# Patient Record
Sex: FEMALE | Race: WHITE | NOT HISPANIC OR LATINO | URBAN - METROPOLITAN AREA
[De-identification: names, ages, dates, MRNs, and addresses within clinical notes are randomized per-mention and may not be internally consistent; named-entity substitution may affect disease eponyms.]

---

## 2018-07-12 ENCOUNTER — TELEPHONE (OUTPATIENT)
Dept: GYNECOLOGY | Facility: CLINIC | Age: 23
End: 2018-07-12

## 2018-07-12 RX ORDER — DROSPIRENONE AND ETHINYL ESTRADIOL 0.02-3(28)
KIT ORAL EVERY 24 HOURS
COMMUNITY
Start: 2017-10-12 | End: 2018-10-25

## 2018-07-12 RX ORDER — ESTRADIOL 1 MG/1
TABLET ORAL
COMMUNITY
Start: 2017-10-12 | End: 2018-10-25

## 2018-07-13 ENCOUNTER — TELEPHONE (OUTPATIENT)
Dept: GYNECOLOGY | Facility: CLINIC | Age: 23
End: 2018-07-13

## 2018-07-13 DIAGNOSIS — Z30.41 SURVEILLANCE OF CONTRACEPTIVE PILL: Primary | ICD-10-CM

## 2018-07-13 RX ORDER — DROSPIRENONE AND ETHINYL ESTRADIOL 0.02-3(28)
1 KIT ORAL DAILY
Qty: 28 TABLET | Refills: 0 | Status: SHIPPED | OUTPATIENT
Start: 2018-07-13 | End: 2018-10-25

## 2018-10-25 ENCOUNTER — ANNUAL EXAM (OUTPATIENT)
Dept: GYNECOLOGY | Facility: CLINIC | Age: 23
End: 2018-10-25
Payer: COMMERCIAL

## 2018-10-25 VITALS
DIASTOLIC BLOOD PRESSURE: 70 MMHG | HEIGHT: 64 IN | BODY MASS INDEX: 30.56 KG/M2 | WEIGHT: 179 LBS | SYSTOLIC BLOOD PRESSURE: 116 MMHG | HEART RATE: 72 BPM

## 2018-10-25 DIAGNOSIS — L70.0 ACNE CYSTICA: Primary | ICD-10-CM

## 2018-10-25 PROCEDURE — 99395 PREV VISIT EST AGE 18-39: CPT | Performed by: OBSTETRICS & GYNECOLOGY

## 2018-10-27 NOTE — PROGRESS NOTES
Assessment/Plan:    1  Acne responding to OC  There are no diagnoses linked to this encounter  Subjective:      Patient ID: Alvina Gerardo is a 21 y o  female  The patient is a 72-year-old who presents for an annual exam   She has been on oral contraceptives for treatment of acne  She states she would like to continue her current pill  She had previously been on yet as but had a lot of breakthrough bleeding  She is currently on Balziva, a generic of Ovcon 35, and would like to continue  She denies any dysmenorrhea  The patient denies any breast problems  The patient states she is a  and if she has an over full bladder she may have occasional stress incontinence of a few drops  It is not bad enough for her to wear a pad all the time and she certainly does not want any treatment for it  She has never been sexually active  She is aware that she is due for a Pap smear  She is currently having menstrual bleeding  She has never used a tampon and was very uncomfortable with the attempt at inserting a narrow speculum today  Because she has never been sexually active, we opted to do for the Pap smear until next year  In the meantime she will try to use a tampon and see if she gets comfortable with the concept of having something in her vagina  Hopefully next year she will not be bleeding during her exam and it will be easier to find her cervix  The GC and chlamydia were also deferred  The following portions of the patient's history were reviewed and updated as appropriate: allergies, current medications, past family history, past medical history, past social history, past surgical history and problem list     Review of Systems   Constitutional: Negative  Respiratory: Negative for shortness of breath  Cardiovascular: Negative for chest pain  Gastrointestinal: Negative  Genitourinary: Negative  Skin: Negative      Psychiatric/Behavioral: Negative for agitation, behavioral problems and confusion  Objective:      /70 (Cuff Size: Adult)   Pulse 72   Ht 5' 4" (1 626 m)   Wt 81 2 kg (179 lb)   LMP 10/22/2018   BMI 30 73 kg/m²          Physical Exam   Constitutional: She appears well-developed and well-nourished  No distress  HENT:   Head: Normocephalic  Pulmonary/Chest: Effort normal  No respiratory distress  Abdominal: She exhibits no distension and no mass  There is no tenderness  There is no rebound and no guarding  Genitourinary: No breast swelling, tenderness, discharge or bleeding  No labial fusion  There is no rash, tenderness, lesion or injury on the right labia  There is no rash, tenderness, lesion or injury on the left labia  Uterus is not enlarged and not tender  Cervix exhibits no motion tenderness, no discharge and no friability  Right adnexum displays no mass, no tenderness and no fullness  Left adnexum displays no mass, no tenderness and no fullness  There is bleeding in the vagina  Genitourinary Comments: Examination was limited by a narrow introitus and patient discomfort  Lymphadenopathy:        Right axillary: No pectoral and no lateral adenopathy present  Left axillary: No pectoral and no lateral adenopathy present  Skin: Skin is warm, dry and intact  She is not diaphoretic  No cyanosis  Nails show no clubbing  Psychiatric: She has a normal mood and affect   Her speech is normal and behavior is normal

## 2019-01-28 ENCOUNTER — TELEPHONE (OUTPATIENT)
Dept: GYNECOLOGY | Facility: CLINIC | Age: 24
End: 2019-01-28

## 2019-01-28 DIAGNOSIS — L70.0 ACNE VULGARIS: Primary | ICD-10-CM

## 2019-04-10 ENCOUNTER — TELEPHONE (OUTPATIENT)
Dept: GYNECOLOGY | Facility: CLINIC | Age: 24
End: 2019-04-10

## 2019-04-10 DIAGNOSIS — Z79.3 ON ORAL CONTRACEPTIVE THERAPY FOR ACNE: Primary | ICD-10-CM

## 2019-04-10 DIAGNOSIS — L70.9 ON ORAL CONTRACEPTIVE THERAPY FOR ACNE: Primary | ICD-10-CM

## 2019-04-18 RX ORDER — NORETHINDRONE ACETATE AND ETHINYL ESTRADIOL AND FERROUS FUMARATE 1MG-20(24)
1 KIT ORAL DAILY
Qty: 84 TABLET | Refills: 4 | Status: SHIPPED | OUTPATIENT
Start: 2019-04-18 | End: 2019-05-23 | Stop reason: SDUPTHER

## 2019-05-23 ENCOUNTER — TELEPHONE (OUTPATIENT)
Dept: GYNECOLOGY | Facility: CLINIC | Age: 24
End: 2019-05-23

## 2019-05-23 DIAGNOSIS — Z79.3 ON ORAL CONTRACEPTIVE THERAPY FOR ACNE: ICD-10-CM

## 2019-05-23 DIAGNOSIS — L70.9 ON ORAL CONTRACEPTIVE THERAPY FOR ACNE: ICD-10-CM

## 2019-05-23 RX ORDER — NORETHINDRONE ACETATE AND ETHINYL ESTRADIOL AND FERROUS FUMARATE 1MG-20(24)
1 KIT ORAL DAILY
Qty: 84 TABLET | Refills: 4 | Status: SHIPPED | OUTPATIENT
Start: 2019-05-23 | End: 2019-09-12 | Stop reason: SDUPTHER

## 2019-09-12 DIAGNOSIS — L70.9 ON ORAL CONTRACEPTIVE THERAPY FOR ACNE: ICD-10-CM

## 2019-09-12 DIAGNOSIS — Z79.3 ON ORAL CONTRACEPTIVE THERAPY FOR ACNE: ICD-10-CM

## 2019-09-12 RX ORDER — NORETHINDRONE ACETATE AND ETHINYL ESTRADIOL AND FERROUS FUMARATE 1MG-20(24)
1 KIT ORAL DAILY
Qty: 84 TABLET | Refills: 4 | Status: SHIPPED | OUTPATIENT
Start: 2019-09-12

## 2025-03-25 NOTE — TELEPHONE ENCOUNTER
Luz Marina Rodriguez, LPN2 hours ago (6:12 AM)     An order was received for a Colonoscopy Screening. This appears to be a recall, last performed  2/20/20 by Dr Price. Will route message to Dr Leodan wright to review.     Report Located: Surgeries tab         Notify pt rx was sent